# Patient Record
Sex: FEMALE | Race: BLACK OR AFRICAN AMERICAN | NOT HISPANIC OR LATINO
[De-identification: names, ages, dates, MRNs, and addresses within clinical notes are randomized per-mention and may not be internally consistent; named-entity substitution may affect disease eponyms.]

---

## 2021-03-11 ENCOUNTER — APPOINTMENT (OUTPATIENT)
Dept: BREAST CENTER | Facility: CLINIC | Age: 61
End: 2021-03-11
Payer: COMMERCIAL

## 2021-03-11 VITALS — HEART RATE: 83 BPM | DIASTOLIC BLOOD PRESSURE: 85 MMHG | SYSTOLIC BLOOD PRESSURE: 157 MMHG

## 2021-03-11 VITALS — BODY MASS INDEX: 29.62 KG/M2 | HEIGHT: 69 IN | WEIGHT: 200 LBS

## 2021-03-11 PROBLEM — Z00.00 ENCOUNTER FOR PREVENTIVE HEALTH EXAMINATION: Status: ACTIVE | Noted: 2021-03-11

## 2021-03-11 PROCEDURE — 99072 ADDL SUPL MATRL&STAF TM PHE: CPT

## 2021-03-11 PROCEDURE — 99205 OFFICE O/P NEW HI 60 MIN: CPT

## 2021-03-11 NOTE — PHYSICAL EXAM
[Normocephalic] : normocephalic [Atraumatic] : atraumatic [Supple] : supple [No Supraclavicular Adenopathy] : no supraclavicular adenopathy [No Cervical Adenopathy] : no cervical adenopathy [No Thyromegaly] : no thyromegaly [Normal Sinus Rhythm] : normal sinus rhythm [Examined in the supine and seated position] : examined in the supine and seated position [No dominant masses] : no dominant masses in right breast  [No dominant masses] : no dominant masses left breast [No Nipple Retraction] : no left nipple retraction [No Nipple Discharge] : no left nipple discharge [No Axillary Lymphadenopathy] : no left axillary lymphadenopathy [No Edema] : no edema [No Rashes] : no rashes [No Ulceration] : no ulceration [de-identified] : +core Bx site L 2:00 N5 w/ vague nod th c/t Bx site

## 2021-03-11 NOTE — HISTORY OF PRESENT ILLNESS
[FreeTextEntry1] : Pt w/ L DCIS detected as microcalcs on Scr Mammo (12/8/20)\par Mammo (12/8/20): FG, +5mm group faint calc's L 2:00 N5\par L Mammo w/ Mag Views (1/6/21): +1.3cm faint microcalcs L 2:00 N5 > Bx rec'ed\par Repeat L Mammo w/ Mag Views ([place])(3/3/21)" +5mm cluster calc's L 2:00 > Bx rec'ed\par S/P L Stereot Bx (L 2:00)(3/4/21): +DCIS, int grade, ER+, NC+\par

## 2021-03-17 DIAGNOSIS — R92.8 OTHER ABNORMAL AND INCONCLUSIVE FINDINGS ON DIAGNOSTIC IMAGING OF BREAST: ICD-10-CM

## 2021-04-02 ENCOUNTER — APPOINTMENT (OUTPATIENT)
Dept: BREAST CENTER | Facility: HOSPITAL | Age: 61
End: 2021-04-02
Payer: COMMERCIAL

## 2021-04-02 PROCEDURE — 19301 PARTIAL MASTECTOMY: CPT | Mod: LT

## 2021-04-12 ENCOUNTER — APPOINTMENT (OUTPATIENT)
Dept: BREAST CENTER | Facility: CLINIC | Age: 61
End: 2021-04-12
Payer: COMMERCIAL

## 2021-04-12 DIAGNOSIS — Z12.31 ENCOUNTER FOR SCREENING MAMMOGRAM FOR MALIGNANT NEOPLASM OF BREAST: ICD-10-CM

## 2021-04-12 PROCEDURE — 99024 POSTOP FOLLOW-UP VISIT: CPT

## 2021-04-12 NOTE — HISTORY OF PRESENT ILLNESS
[FreeTextEntry1] : S/P L PMX w/NL (4/2/21): +2.5cm DCIS, int grade, -margins, ER+, [place]+\par Pt w/ L DCIS detected as microcalcs on Scr Mammo (12/8/20)\par Mammo (12/8/20): FG, +5mm group faint calc's L 2:00 N5\par L Mammo w/ Mag Views (1/6/21): +1.3cm faint microcalcs L 2:00 N5 > Bx rec'ed\par Repeat L Mammo w/ Mag Views ([place])(3/3/21)" +5mm cluster calc's L 2:00 > Bx rec'ed\par S/P L Stereot Bx (L 2:00)(3/4/21): +DCIS, int grade, ER+, IA+\par

## 2021-04-12 NOTE — PHYSICAL EXAM
[de-identified] : Pt w/o c/o\par Incision C&D. Min induration/ecchymosis. PDS d/c'ed.\par Stable. Pathology discussed.

## 2021-04-26 ENCOUNTER — APPOINTMENT (OUTPATIENT)
Dept: RADIATION ONCOLOGY | Facility: CLINIC | Age: 61
End: 2021-04-26
Payer: COMMERCIAL

## 2021-04-26 VITALS
OXYGEN SATURATION: 100 % | RESPIRATION RATE: 16 BRPM | DIASTOLIC BLOOD PRESSURE: 80 MMHG | WEIGHT: 222 LBS | TEMPERATURE: 98 F | SYSTOLIC BLOOD PRESSURE: 148 MMHG | BODY MASS INDEX: 32.88 KG/M2 | HEIGHT: 69 IN | HEART RATE: 78 BPM

## 2021-04-26 DIAGNOSIS — Z80.51 FAMILY HISTORY OF MALIGNANT NEOPLASM OF KIDNEY: ICD-10-CM

## 2021-04-26 DIAGNOSIS — Z78.9 OTHER SPECIFIED HEALTH STATUS: ICD-10-CM

## 2021-04-26 DIAGNOSIS — Z63.5 DISRUPTION OF FAMILY BY SEPARATION AND DIVORCE: ICD-10-CM

## 2021-04-26 PROCEDURE — 99204 OFFICE O/P NEW MOD 45 MIN: CPT

## 2021-04-26 PROCEDURE — 99072 ADDL SUPL MATRL&STAF TM PHE: CPT

## 2021-04-26 SDOH — SOCIAL STABILITY - SOCIAL INSECURITY: DISRUPTION OF FAMILY BY SEPARATION AND DIVORCE: Z63.5

## 2021-04-26 NOTE — VITALS
[Maximal Pain Intensity: 0/10] : 0/10 [Least Pain Intensity: 0/10] : 0/10 [NoTreatment Scheduled] : no treatment scheduled [90: Able to carry normal activity; minor signs or symptoms of disease.] : 90: Able to carry normal activity; minor signs or symptoms of disease.  [ECOG Performance Status: 1 - Restricted in physically strenuous activity but ambulatory and able to carry out work of a light or sedentary nature] : Performance Status: 1 - Restricted in physically strenuous activity but ambulatory and able to carry out work of a light or sedentary nature, e.g., light house work, office work [Date: ____________] : Patient's last distress assessment performed on [unfilled]. [0 - No Distress] : Distress Level: 0

## 2021-05-07 ENCOUNTER — APPOINTMENT (OUTPATIENT)
Dept: HEMATOLOGY ONCOLOGY | Facility: CLINIC | Age: 61
End: 2021-05-07
Payer: COMMERCIAL

## 2021-05-07 VITALS
HEART RATE: 75 BPM | WEIGHT: 218 LBS | DIASTOLIC BLOOD PRESSURE: 85 MMHG | OXYGEN SATURATION: 100 % | RESPIRATION RATE: 18 BRPM | SYSTOLIC BLOOD PRESSURE: 136 MMHG | TEMPERATURE: 98.8 F | HEIGHT: 69 IN | BODY MASS INDEX: 32.29 KG/M2

## 2021-05-07 PROCEDURE — 99205 OFFICE O/P NEW HI 60 MIN: CPT

## 2021-05-07 PROCEDURE — 99072 ADDL SUPL MATRL&STAF TM PHE: CPT

## 2021-05-07 NOTE — REASON FOR VISIT
[Initial Consultation] : an initial consultation [FreeTextEntry2] : Patient presents for medical oncology evaluation for DCIS

## 2021-05-07 NOTE — ASSESSMENT
[FreeTextEntry1] : Is a 60-year-old woman with DCIS ER/TX positive of the left breast status post lumpectomy, presenting for discussion regarding adjuvant antiestrogen therapy.\par \par 1) DCIS \par Reviewed the pathology at length\par Explained the risk of recurrence both in the ipsilateral and contralateral breast\par Agree with radiation, patient will be starting shortly with Dr. Mahmood\par Discussed the use of antiestrogens to reduce the risk of recurrence in both breasts\par Reviewed the mechanism, efficacy, side effects of tamoxifen versus aromatase inhibitors.\par On average antiestrogens can decrease the risk of recurrence by about 50%\par Tamoxifen is associated with an increased risk of endometrial cancer as well as thrombosis particularly in postmenopausal women\par Aromatase inhibitors are associated with increased risk of bone thinning joint pains and hot flashes.\par Given patient is postmenopausal we will likely go with an aromatase inhibitor but we will get a bone density to make sure she does not have any osteopenia or osteoporosis.  Explained that if she does have an element of bone thinning we will need to consider additional treatment to help prevent worsening while on an aromatase inhibitor. \par  We will discuss further After radiation\par Check CMP and breast cancer markers\par Patient will need close follow-up with Dr. GERTRUDE corrales her surgeon as well as monitoring of imaging\par \par 2) genetics \par Patient has been seen by genetics and genetic testing is currently pending given patient has breast cancer and her son  of kidney cancer in his 20s\par \par 3) vit d \par check level \par \par 4) osteopenia \par check dex \par \par All the above discussed with the patient at length today, time spent over 1 hour\par Follow-up after radiation

## 2021-05-07 NOTE — REVIEW OF SYSTEMS
[Fatigue] : fatigue [Negative] : Allergic/Immunologic [Fever] : no fever [Recent Change In Weight] : ~T no recent weight change [FreeTextEntry7] : colonsocopy up todate (sometme this year)  [FreeTextEntry8] : fibroid had hystectomy  [FreeTextEntry9] : right hip replacement , better now , 6 years ago last bone density

## 2021-05-07 NOTE — HISTORY OF PRESENT ILLNESS
[de-identified] : This is 60-year-old postmenopausal woman ending for new diagnosis of DCIS.\par December 2020 patient had a mammogram which showed a 5 mm group of faint calcifications at the 2 o'clock position left breast.  Follow-up mammogram showed 1.3 cm of faint microcalcifications in January 2021.  Then follow-up mammogram and March 2021 showed a 5 mm cluster of calcifications biopsy recommended.  Stereotactic left breast biopsy performed on March 4, 2021 revealing for DCIS intermediate grade ER/MN positive\par Patient underwent a left partial mastectomy on April 2, 2021 with Dr. Del Rosario, this was revealing for 2.5 cm of DCIS intermediate grade negative margins ER/MN positive(99 and 95%). \par All went well with  surgery . No infections \par She saw dr monterroso and he is planning on starting radiation. Maping on monday. \par \par [facility]ime at [employer] , mostly fed ex. \par \par Every year gyn, only has cervix \par \par neice - had some kind of cancer \par son- had kidney cancer \par \par Saw genetic , sent testing as part of gene protocol. \par \par \par \par \par  [de-identified] : Feeling well no new complaints\par No fevers, healing well from surgery energy is improving

## 2021-05-07 NOTE — PHYSICAL EXAM
[Normal] : affect appropriate [de-identified] : No masses or adenopathy bilaterally, lumpectomy healing well in the left outer quadrant, no drainage or erythema or warmth

## 2021-05-09 LAB
25(OH)D3 SERPL-MCNC: 31.3 NG/ML
ALBUMIN SERPL ELPH-MCNC: 4.1 G/DL
ALP BLD-CCNC: 92 U/L
ALT SERPL-CCNC: 12 U/L
ANION GAP SERPL CALC-SCNC: 14 MMOL/L
AST SERPL-CCNC: 17 U/L
BASOPHILS # BLD AUTO: 0.04 K/UL
BASOPHILS NFR BLD AUTO: 0.9 %
BILIRUB SERPL-MCNC: <0.2 MG/DL
BUN SERPL-MCNC: 17 MG/DL
CALCIUM SERPL-MCNC: 10 MG/DL
CANCER AG15-3 SERPL-ACNC: 37.7 U/ML
CEA SERPL-MCNC: 1.7 NG/ML
CHLORIDE SERPL-SCNC: 103 MMOL/L
CO2 SERPL-SCNC: 26 MMOL/L
CREAT SERPL-MCNC: 0.79 MG/DL
EOSINOPHIL # BLD AUTO: 0.09 K/UL
EOSINOPHIL NFR BLD AUTO: 1.9 %
GLUCOSE SERPL-MCNC: 70 MG/DL
HCT VFR BLD CALC: 35.2 %
HGB BLD-MCNC: 10.3 G/DL
IMM GRANULOCYTES NFR BLD AUTO: 0 %
LYMPHOCYTES # BLD AUTO: 1.82 K/UL
LYMPHOCYTES NFR BLD AUTO: 39.2 %
MAN DIFF?: NORMAL
MCHC RBC-ENTMCNC: 24.6 PG
MCHC RBC-ENTMCNC: 29.3 GM/DL
MCV RBC AUTO: 84 FL
MONOCYTES # BLD AUTO: 0.33 K/UL
MONOCYTES NFR BLD AUTO: 7.1 %
NEUTROPHILS # BLD AUTO: 2.36 K/UL
NEUTROPHILS NFR BLD AUTO: 50.9 %
PLATELET # BLD AUTO: 329 K/UL
POTASSIUM SERPL-SCNC: 5.6 MMOL/L
PROT SERPL-MCNC: 7.8 G/DL
RBC # BLD: 4.19 M/UL
RBC # FLD: 16.5 %
SODIUM SERPL-SCNC: 143 MMOL/L
WBC # FLD AUTO: 4.64 K/UL

## 2021-05-10 NOTE — HISTORY OF PRESENT ILLNESS
[FreeTextEntry1] : Ms. Pickens is a 60 year old female referred here by Dr. Del Rosario. \par \par On 3/3/21 she had a mammogram at [facility] which revealed a 5mm cluster of mildly variable calcifications upper outer left breast 2:00 position. \par \par On 3/4/21 Dr. Del Rosario- [facility]\par FINAL PATHOLOGIC DIAGNOSIS\par 1. LEFT BREAST 2:00 UPPER OUTER WITH CALC, STEREOTACTIC BIOPSY:\par -DUCTAL CARCINOMA IN SITU, FOCAL:\par 	-CRIBRIFORM.\par 	-INTERMEDIATE GRADE.\par 	-WITH NECROSIS.\par 	-WITH CALCIFICATION. \par -PER REPORT, BOTH ER AND MI ARE >90% STRONG INTENSITY NUCLEAR STAINING.\par \par 2. LEFT BREAST 2:00 UPPER OUTER, STEREOTACTIC BIOPSY:\par -MILDLY ECTATIC / DILATED DUCT AND VARIABLY FIBROUS STROMA.\par -CLINICAL CORRELATION IS APPRECIATED TO ASSESS IF CURRENT SAMPLE REPRESENTS TARGET.  \par \par 3/16/2021 MRI Bilateral Breasts ( [facility]) revealed a 2.3 x 1.8 cm area of clumped enhancement surrounding the biopsy cavity in the 2:00 position of the left breast at the site of the known DCIS. No other suspicious enhancing lesion is seen in the left breast and there is no suspicious left axillary adenopathy \par multiple mildly prominent lymph nodes are present in the right axilla. There is also probable intramammary lymph nodes at the 8;00 position of the right breast. Correlation with the diagnostic right mammography and right breast and axillary ultrasound is recommended to confirm that his represents a benign intramammary lymph nodes as well as reactive axillary lymph nodes secondary to the covid vaccine \par \par On 3/18/21 she had a unilateral right breast mammogram at [facility] which revealed  no mammographic evidence for breast malignancy. A lymph node is seen at the 9:00 periphery of the right breast. \par \par On 3/19/21 she had a breast ultrasound at [facility] which revealed benign inframammary lymph node at the 8:00 position of the right breast. Multiple benign-appearing lymph nodes are also present from 10-11:00 position of the right breast and within the axilla. The largest of the lymph nodes in the right axilla has a borderline thickened cortex of 3mm. \par \[facility] Procedure:   Left partial mastectomy.\par Tumor Site:  2:00.\par Size/Extent of DCIS:  ~25 mm.\par Histologic type:  Ductal carcinoma in situ.\par    Architectural Pattern:  Cribriform and solid.\par    Nuclear Grade:  Intermediate.\par    Necrosis:  Central necrosis present.\par Margins: \par    Distance from closest margin:  2 mm superior.\par    Distance from other margins:  5 mm inferior, 6 mm medial, others >10 mm.\par Regional Lymph Nodes:  No nodes submitted or found.\par Breast Biomarker Testing:\par    Estrogen Receptor:  Positive 99% 2-3+\par    Progesterone Receptor:  Positive 95% 2-3+\par Previous Specimen:  [id number] Left breast core biopsy 2:00 (outside slides):  \par    Ductal carcinoma in situ, intermediate grade, reported ER and MI >90% 3+.\par Pathologic Stage Classification (AJCC 8th ed):  p Tis(DCIS) NX\par \par FINAL PATHOLOGIC DIAGNOSIS\par LEFT PARTIAL MASTECTOMY: \par -DUCTAL CARCINOMA IN SITU.\par      -INTERMEDIATE NUCLEAR GRADE.\par      -CRIBRIFORM AND SOLID PATTERNS WITH CENTRAL NECROSIS, MICROCALCIFICATIONS\par       AND EXTENSION INTO LOBULES.\par      -DCIS SPAN:  ~25 MM.\par      -NARROWEST SURGICAL MARGINS:  2 MM FROM SUPERIOR SURFACE, 5 MM FROM\par       INFERIOR SURFACE AND 6 MM FROM MEDIAL SURFACE.  OTHER MARGINS >10 MM.\par -CYSTS, APOCRINE METAPLASIA, COLUMNAR CELL CHANGE AND LUMINAL CALCIFICATIONS.\par -PRIOR BIOPSY SITE.\par -SKIN WITH PIGMENTED SEBORRHEIC KERATOSIS. \par \par DCIS BIOMARKER IMMUNOSTAINS:\par -ER:  POSITIVE 99% 2-3+\par -MI:  POSITIVE 95% 2-3+\par \par Ms. Pickens states she is overall feeling well. She is 3 weeks s/p surgery and is healed and denies any pain. She has full /ROM of the left arm. She is going to see Dr. Irving Rodriguez on 5/7/2021.

## 2021-05-10 NOTE — PHYSICAL EXAM
[Breast Appearance] : normal in appearance [Breast Palpation Mass] : no palpable masses [Breast Abnormal Lactation (Galactorrhea)] : no nipple discharge [No UE Edema] : there is no upper extremity edema [Normal] : oriented to person, place and time, the affect was normal, the mood was normal and not anxious [de-identified] : Minimal asymmetry with the right breast smaller than left.  Well-healed lumpectomy scar in upper outer quadrant of left breast with no underlying induration.  No hematoma palpable.  Overlying skin looks healthy.  No lymph nodes palpable in left axilla.

## 2021-05-11 ENCOUNTER — NON-APPOINTMENT (OUTPATIENT)
Age: 61
End: 2021-05-11

## 2021-05-13 ENCOUNTER — NON-APPOINTMENT (OUTPATIENT)
Age: 61
End: 2021-05-13

## 2021-05-14 ENCOUNTER — LABORATORY RESULT (OUTPATIENT)
Age: 61
End: 2021-05-14

## 2021-05-14 ENCOUNTER — APPOINTMENT (OUTPATIENT)
Dept: HEMATOLOGY ONCOLOGY | Facility: CLINIC | Age: 61
End: 2021-05-14

## 2021-05-14 VITALS
HEIGHT: 69 IN | TEMPERATURE: 98 F | BODY MASS INDEX: 32.73 KG/M2 | WEIGHT: 221 LBS | HEART RATE: 80 BPM | OXYGEN SATURATION: 100 % | DIASTOLIC BLOOD PRESSURE: 85 MMHG | SYSTOLIC BLOOD PRESSURE: 150 MMHG | RESPIRATION RATE: 18 BRPM

## 2021-05-16 LAB
ANA SER IF-ACNC: NEGATIVE
BASOPHILS # BLD AUTO: 0.04 K/UL
BASOPHILS NFR BLD AUTO: 0.9 %
CANCER AG15-3 SERPL-ACNC: 34.2 U/ML
DEPRECATED KAPPA LC FREE/LAMBDA SER: 2.45 RATIO
EOSINOPHIL # BLD AUTO: 0.12 K/UL
EOSINOPHIL NFR BLD AUTO: 2.6 %
FERRITIN SERPL-MCNC: 48 NG/ML
FOLATE SERPL-MCNC: >20 NG/ML
HCT VFR BLD CALC: 32.9 %
HGB BLD-MCNC: 9.7 G/DL
IMM GRANULOCYTES NFR BLD AUTO: 0.2 %
IRON SATN MFR SERPL: 13 %
IRON SERPL-MCNC: 37 UG/DL
KAPPA LC CSF-MCNC: 1.32 MG/DL
KAPPA LC SERPL-MCNC: 3.24 MG/DL
LDH SERPL-CCNC: 288 U/L
LYMPHOCYTES # BLD AUTO: 1.83 K/UL
LYMPHOCYTES NFR BLD AUTO: 40.4 %
MAN DIFF?: NORMAL
MCHC RBC-ENTMCNC: 25.1 PG
MCHC RBC-ENTMCNC: 29.5 GM/DL
MCV RBC AUTO: 85 FL
MONOCYTES # BLD AUTO: 0.35 K/UL
MONOCYTES NFR BLD AUTO: 7.7 %
NEUTROPHILS # BLD AUTO: 2.18 K/UL
NEUTROPHILS NFR BLD AUTO: 48.2 %
PLATELET # BLD AUTO: 323 K/UL
RBC # BLD: 3.87 M/UL
RBC # BLD: 3.87 M/UL
RBC # FLD: 16.2 %
RETICS # AUTO: 1 %
RETICS AGGREG/RBC NFR: 36.8 K/UL
TIBC SERPL-MCNC: 289 UG/DL
TSH SERPL-ACNC: 0.98 UIU/ML
UIBC SERPL-MCNC: 252 UG/DL
VIT B12 SERPL-MCNC: 745 PG/ML
WBC # FLD AUTO: 4.53 K/UL

## 2021-05-17 VITALS
TEMPERATURE: 98 F | DIASTOLIC BLOOD PRESSURE: 88 MMHG | OXYGEN SATURATION: 99 % | HEART RATE: 78 BPM | RESPIRATION RATE: 16 BRPM | HEIGHT: 69 IN | SYSTOLIC BLOOD PRESSURE: 146 MMHG

## 2021-05-17 LAB
DIRECT COOMBS: ABNORMAL
HGB A MFR BLD: 97.9 %
HGB A2 MFR BLD: 2.1 %
HGB FRACT BLD-IMP: NORMAL

## 2021-05-17 NOTE — REVIEW OF SYSTEMS
[Fatigue: Grade 0] : Fatigue: Grade 0 [Pruritus: Grade 0] : Pruritus: Grade 0 [Skin Hyperpigmentation: Grade 0] : Skin Hyperpigmentation: Grade 0 [Dermatitis Radiation: Grade 0] : Dermatitis Radiation: Grade 0

## 2021-05-18 NOTE — HISTORY OF PRESENT ILLNESS
[FreeTextEntry1] : Ms. Cowart presents with DCIS of left breast, status post lumpectomy.\par \par 5/13/2021\par Ms. Cowart presents today for OTV. She completed 2/16 fractions to the left breast to a dose of 530 cGy.She is using Calendula cream BID, we discussed location and teach back of where the cream is applied.  She is feeling some heaviness and soreness in the breast.

## 2021-05-18 NOTE — DISEASE MANAGEMENT
[Pathological] : TNM Stage: p [TTNM] : 0 [NTNM] : 0 [MTNM] : 0 [0] : 0 [de-identified] : completed 2/16 fractions to the left breast to a dose of 530 cGy

## 2021-05-18 NOTE — PHYSICAL EXAM
[Normal] : oriented to person, place and time, the affect was normal, the mood was normal and not anxious [de-identified] : No erythema noted

## 2021-05-23 LAB
ALBUMIN MFR SERPL ELPH: 48 %
ALBUMIN SERPL-MCNC: 3.5 G/DL
ALBUMIN/GLOB SERPL: 0.9 RATIO
ALPHA1 GLOB MFR SERPL ELPH: 4.5 %
ALPHA1 GLOB SERPL ELPH-MCNC: 0.3 G/DL
ALPHA2 GLOB MFR SERPL ELPH: 12.6 %
ALPHA2 GLOB SERPL ELPH-MCNC: 0.9 G/DL
B-GLOBULIN MFR SERPL ELPH: 13 %
B-GLOBULIN SERPL ELPH-MCNC: 0.9 G/DL
DEPRECATED KAPPA LC FREE/LAMBDA SER: 2.45 RATIO
GAMMA GLOB FLD ELPH-MCNC: 1.6 G/DL
GAMMA GLOB MFR SERPL ELPH: 21.9 %
IGA SER QL IEP: 187 MG/DL
IGG SER QL IEP: 1784 MG/DL
IGM SER QL IEP: 113 MG/DL
INTERPRETATION SERPL IEP-IMP: NORMAL
KAPPA LC CSF-MCNC: 1.32 MG/DL
KAPPA LC SERPL-MCNC: 3.24 MG/DL
M PROTEIN MFR SERPL ELPH: NORMAL
M PROTEIN SPEC IFE-MCNC: NORMAL
MONOCLON BAND OBS SERPL: NORMAL
PROT SERPL-MCNC: 7.2 G/DL
PROT SERPL-MCNC: 7.2 G/DL

## 2021-05-25 ENCOUNTER — NON-APPOINTMENT (OUTPATIENT)
Age: 61
End: 2021-05-25

## 2021-05-25 VITALS
DIASTOLIC BLOOD PRESSURE: 79 MMHG | OXYGEN SATURATION: 99 % | RESPIRATION RATE: 16 BRPM | TEMPERATURE: 98 F | HEART RATE: 75 BPM | SYSTOLIC BLOOD PRESSURE: 119 MMHG

## 2021-05-25 NOTE — REVIEW OF SYSTEMS
[Fatigue: Grade 0] : Fatigue: Grade 0 [Breast Pain: Grade 1 - Mild pain] : Breast Pain: Grade 1 - Mild pain [Pruritus: Grade 0] : Pruritus: Grade 0 [Skin Hyperpigmentation: Grade 1 - Hyperpigmentation covering <10% BSA; no psychosocial impact] : Skin Hyperpigmentation: Grade 1 - Hyperpigmentation covering <10% BSA; no psychosocial impact [Dermatitis Radiation: Grade 0] : Dermatitis Radiation: Grade 0 [FreeTextEntry7] : soreness

## 2021-05-25 NOTE — PHYSICAL EXAM
[Normal] : oriented to person, place and time, the affect was normal, the mood was normal and not anxious [de-identified] : Moderate erythema of the breast.  No desquamation

## 2021-05-25 NOTE — HISTORY OF PRESENT ILLNESS
[FreeTextEntry1] : Ms. Cowart presents with DCIS of left breast, status post lumpectomy.\par \par 5/13/2021\par Ms. Cowart presents today for OTV. She completed 2/16 fractions to the left breast to a dose of 530 cGy.She is using Calendula cream BID, we discussed location and teach back of where the cream is applied.  She is feeling some heaviness and soreness in the breast. \par \par 5/25/2021\par Ms. Cowart presents today for OTV. She completed 10/16 fractions to the left breast to a dose of 2650 cGy.She is using Calendula cream BID, She has some swelling of the left breast and soreness. She has some hyperpigmentation of the skin. She has developed some pain in the left shoulder over the weekend. She is not sure if she pulled a muscle or if it could be from the position on the table, She will try some Advil and we will f/u with her.

## 2021-05-25 NOTE — DISEASE MANAGEMENT
[Pathological] : TNM Stage: p [0] : 0 [TTNM] : 0 [NTNM] : 0 [MTNM] : 0 [de-identified] : completed 10/16 fractions to the left breast to a dose of 2650 cGy

## 2021-06-01 ENCOUNTER — NON-APPOINTMENT (OUTPATIENT)
Age: 61
End: 2021-06-01

## 2021-06-01 VITALS
OXYGEN SATURATION: 99 % | RESPIRATION RATE: 16 BRPM | SYSTOLIC BLOOD PRESSURE: 122 MMHG | HEART RATE: 82 BPM | DIASTOLIC BLOOD PRESSURE: 80 MMHG | TEMPERATURE: 98 F

## 2021-06-01 NOTE — REVIEW OF SYSTEMS
[Fatigue: Grade 0] : Fatigue: Grade 0 [Localized Edema: Grade 1 - Localized to dependent areas, no disability or functional impairment] : Localized Edema: Grade 1 - Localized to dependent areas, no disability or functional impairment [Breast Pain: Grade 1 - Mild pain] : Breast Pain: Grade 1 - Mild pain [Pruritus: Grade 0] : Pruritus: Grade 0 [Skin Hyperpigmentation: Grade 1 - Hyperpigmentation covering <10% BSA; no psychosocial impact] : Skin Hyperpigmentation: Grade 1 - Hyperpigmentation covering <10% BSA; no psychosocial impact [Dermatitis Radiation: Grade 0] : Dermatitis Radiation: Grade 0 [FreeTextEntry4] : left breast swelling  [FreeTextEntry7] : soreness

## 2021-06-01 NOTE — HISTORY OF PRESENT ILLNESS
[FreeTextEntry1] : Ms. Cowart presents with DCIS of left breast, status post lumpectomy.\par \par 5/13/2021\par Ms. Cowart presents today for OTV. She completed 2/16 fractions to the left breast to a dose of 530 cGy.She is using Calendula cream BID, we discussed location and teach back of where the cream is applied.  She is feeling some heaviness and soreness in the breast. \par \par 5/25/2021\par Ms. Cowart presents today for OTV. She completed 10/16 fractions to the left breast to a dose of 2650 cGy.She is using Calendula cream BID, She has some swelling of the left breast and soreness. She has some hyperpigmentation of the skin. She has developed some pain in the left shoulder over the weekend. She is not sure if she pulled a muscle or if it could be from the position on the table, She will try some Advil and we will f/u with her.  \par \par 6//1/2021\par peter Cowart presents today for OTV. She completed 14/16 fractions to the left breast to a dose of 3710 cGy.She is using Calendula cream BID, She has  swelling of the left breast and soreness. She has  hyperpigmentation of the skin but it is intact. The pain in the left shoulder has improved some with Aleve.

## 2021-06-01 NOTE — PHYSICAL EXAM
[Normal] : oriented to person, place and time, the affect was normal, the mood was normal and not anxious [de-identified] : Brisk erythema with hyperpigmentation of left breast.  No desquamation seen

## 2021-06-01 NOTE — DISEASE MANAGEMENT
[Pathological] : TNM Stage: p [0] : 0 [TTNM] : 0 [NTNM] : 0 [MTNM] : 0 [de-identified] : completed 14/16 fractions to the left breast to a dose of 3710 cGy

## 2021-06-08 ENCOUNTER — NON-APPOINTMENT (OUTPATIENT)
Age: 61
End: 2021-06-08

## 2021-06-08 VITALS
DIASTOLIC BLOOD PRESSURE: 82 MMHG | RESPIRATION RATE: 14 BRPM | OXYGEN SATURATION: 99 % | BODY MASS INDEX: 32.58 KG/M2 | HEART RATE: 74 BPM | TEMPERATURE: 98 F | SYSTOLIC BLOOD PRESSURE: 142 MMHG | HEIGHT: 69 IN | WEIGHT: 220 LBS

## 2021-06-08 NOTE — DISEASE MANAGEMENT
[Pathological] : TNM Stage: p [0] : 0 [TTNM] : 0 [NTNM] : 0 [MTNM] : 0 [de-identified] : completed 16/16 fractions to the left breast to a dose of 4240cGy, and 2 of 4 boost fractions 500 cGy

## 2021-06-08 NOTE — HISTORY OF PRESENT ILLNESS
[FreeTextEntry1] : Ms. Cowart presents with DCIS of left breast, status post lumpectomy.\par \par 5/13/2021\par Ms. Cowart presents today for OTV. She completed 2/16 fractions to the left breast to a dose of 530 cGy.She is using Calendula cream BID, we discussed location and teach back of where the cream is applied.  She is feeling some heaviness and soreness in the breast. \par \par 5/25/2021\par Ms. Cowart presents today for OTV. She completed 10/16 fractions to the left breast to a dose of 2650 cGy.She is using Calendula cream BID, She has some swelling of the left breast and soreness. She has some hyperpigmentation of the skin. She has developed some pain in the left shoulder over the weekend. She is not sure if she pulled a muscle or if it could be from the position on the table, She will try some Advil and we will f/u with her.  \par \par 6//1/2021\par peter Cowart presents today for OTV. She completed 14/16 fractions to the left breast to a dose of 3710 cGy.She is using Calendula cream BID, She has  swelling of the left breast and soreness. She has  hyperpigmentation of the skin but it is intact. The pain in the left shoulder has improved some with Aleve. \par \par 6/8/21\par She presents today for an OTV and has completed 16/16 fractions to the left breast to a dose of 4240cGy, and 2 of 4 boost fractions 500 cGy. She is using calendula cream, and complains of itching on her skin. She has hyperpigmentation on her breast. She denies any pain, she is eating well, and she is sleeping well.

## 2021-06-08 NOTE — PHYSICAL EXAM
[Normal] : oriented to person, place and time, the affect was normal, the mood was normal and not anxious [de-identified] : Brisk hyperpigmentation of left breast.  No desquamation noted

## 2021-06-08 NOTE — REVIEW OF SYSTEMS
[Fatigue: Grade 0] : Fatigue: Grade 0 [Localized Edema: Grade 0] : Localized Edema: Grade 0  [Breast Pain: Grade 1 - Mild pain] : Breast Pain: Grade 1 - Mild pain [Alopecia: Grade 0] : Alopecia: Grade 0 [Pruritus: Grade 1 - Mild or localized; topical intervention indicated] : Pruritus: Grade 1 - Mild or localized; topical intervention indicated [Skin Atrophy: Grade 0] : Skin Atrophy: Grade 0 [Skin Hyperpigmentation: Grade 1 - Hyperpigmentation covering <10% BSA; no psychosocial impact] : Skin Hyperpigmentation: Grade 1 - Hyperpigmentation covering <10% BSA; no psychosocial impact [Skin Induration: Grade 0] : Skin Induration: Grade 0 [Dermatitis Radiation: Grade 0] : Dermatitis Radiation: Grade 0 [FreeTextEntry7] : soreness

## 2021-06-11 ENCOUNTER — APPOINTMENT (OUTPATIENT)
Dept: HEMATOLOGY ONCOLOGY | Facility: CLINIC | Age: 61
End: 2021-06-11
Payer: COMMERCIAL

## 2021-06-11 ENCOUNTER — LABORATORY RESULT (OUTPATIENT)
Age: 61
End: 2021-06-11

## 2021-06-11 VITALS
DIASTOLIC BLOOD PRESSURE: 87 MMHG | WEIGHT: 218 LBS | BODY MASS INDEX: 32.29 KG/M2 | HEART RATE: 77 BPM | TEMPERATURE: 98.2 F | RESPIRATION RATE: 17 BRPM | OXYGEN SATURATION: 100 % | SYSTOLIC BLOOD PRESSURE: 169 MMHG | HEIGHT: 69 IN

## 2021-06-11 PROCEDURE — 99215 OFFICE O/P EST HI 40 MIN: CPT

## 2021-06-11 PROCEDURE — 99072 ADDL SUPL MATRL&STAF TM PHE: CPT

## 2021-06-11 NOTE — HISTORY OF PRESENT ILLNESS
[de-identified] : This is 60-year-old postmenopausal woman ending for new diagnosis of DCIS.\par December 2020 patient had a mammogram which showed a 5 mm group of faint calcifications at the 2 o'clock position left breast.  Follow-up mammogram showed 1.3 cm of faint microcalcifications in January 2021.  Then follow-up mammogram and March 2021 showed a 5 mm cluster of calcifications biopsy recommended.  Stereotactic left breast biopsy performed on March 4, 2021 revealing for DCIS intermediate grade ER/VT positive\par Patient underwent a left partial mastectomy on April 2, 2021 with Dr. Del Rosario, this was revealing for 2.5 cm of DCIS intermediate grade negative margins ER/VT positive(99 and 95%). \par All went well with  surgery . No infections \par She saw dr monterroso and he is planning on starting radiation. Maping on monday. \par \par [facility]ime at [employer] , mostly fed ex. \par \par Every year gyn, only has cervix \par \par neice - had some kind of cancer \par son- had kidney cancer \par \par Saw genetic , sent testing as part of gene protocol. 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

## 2021-06-13 LAB
ALBUMIN SERPL ELPH-MCNC: 4.1 G/DL
ALP BLD-CCNC: 94 U/L
ALT SERPL-CCNC: 12 U/L
ANION GAP SERPL CALC-SCNC: 12 MMOL/L
AST SERPL-CCNC: 17 U/L
BASOPHILS # BLD AUTO: 0.03 K/UL
BASOPHILS NFR BLD AUTO: 0.7 %
BILIRUB SERPL-MCNC: 0.2 MG/DL
BUN SERPL-MCNC: 13 MG/DL
CALCIUM SERPL-MCNC: 10.1 MG/DL
CANCER AG15-3 SERPL-ACNC: 35.9 U/ML
CEA SERPL-MCNC: 1.8 NG/ML
CHLORIDE SERPL-SCNC: 102 MMOL/L
CO2 SERPL-SCNC: 28 MMOL/L
CREAT SERPL-MCNC: 0.76 MG/DL
DEPRECATED KAPPA LC FREE/LAMBDA SER: 1.95 RATIO
EOSINOPHIL # BLD AUTO: 0.1 K/UL
EOSINOPHIL NFR BLD AUTO: 2.4 %
GLUCOSE SERPL-MCNC: 79 MG/DL
HAPTOGLOB SERPL-MCNC: 362 MG/DL
HCT VFR BLD CALC: 35.2 %
HGB BLD-MCNC: 10.4 G/DL
IMM GRANULOCYTES NFR BLD AUTO: 0.2 %
IRON SATN MFR SERPL: 13 %
IRON SERPL-MCNC: 38 UG/DL
KAPPA LC CSF-MCNC: 1.55 MG/DL
KAPPA LC SERPL-MCNC: 3.02 MG/DL
LDH SERPL-CCNC: 211 U/L
LYMPHOCYTES # BLD AUTO: 1.35 K/UL
LYMPHOCYTES NFR BLD AUTO: 33 %
MAN DIFF?: NORMAL
MCHC RBC-ENTMCNC: 24.8 PG
MCHC RBC-ENTMCNC: 29.5 GM/DL
MCV RBC AUTO: 83.8 FL
MONOCYTES # BLD AUTO: 0.3 K/UL
MONOCYTES NFR BLD AUTO: 7.3 %
NEUTROPHILS # BLD AUTO: 2.3 K/UL
NEUTROPHILS NFR BLD AUTO: 56.4 %
PLATELET # BLD AUTO: 346 K/UL
POTASSIUM SERPL-SCNC: 4.8 MMOL/L
PROT SERPL-MCNC: 7.6 G/DL
RBC # BLD: 4.2 M/UL
RBC # BLD: 4.2 M/UL
RBC # FLD: 15.9 %
RETICS # AUTO: 1.2 %
RETICS AGGREG/RBC NFR: 52.1 K/UL
SODIUM SERPL-SCNC: 141 MMOL/L
TIBC SERPL-MCNC: 297 UG/DL
TSH SERPL-ACNC: 1.34 UIU/ML
UIBC SERPL-MCNC: 258 UG/DL
WBC # FLD AUTO: 4.09 K/UL

## 2021-06-20 LAB
ALBUMIN MFR SERPL ELPH: 47.6 %
ALBUMIN SERPL-MCNC: 3.6 G/DL
ALBUMIN/GLOB SERPL: 0.9 RATIO
ALPHA1 GLOB MFR SERPL ELPH: 4.6 %
ALPHA1 GLOB SERPL ELPH-MCNC: 0.3 G/DL
ALPHA2 GLOB MFR SERPL ELPH: 13.3 %
ALPHA2 GLOB SERPL ELPH-MCNC: 1 G/DL
B-GLOBULIN MFR SERPL ELPH: 12.9 %
B-GLOBULIN SERPL ELPH-MCNC: 1 G/DL
DEPRECATED KAPPA LC FREE/LAMBDA SER: 1.95 RATIO
DIRECT COOMBS: ABNORMAL
GAMMA GLOB FLD ELPH-MCNC: 1.6 G/DL
GAMMA GLOB MFR SERPL ELPH: 21.6 %
IGA 24H UR QL IFE: NORMAL
IGA SER QL IEP: 208 MG/DL
IGG SER QL IEP: 1797 MG/DL
IGM SER QL IEP: 105 MG/DL
INTERPRETATION SERPL IEP-IMP: NORMAL
KAPPA LC CSF-MCNC: 1.55 MG/DL
KAPPA LC SERPL-MCNC: 3.02 MG/DL
M PROTEIN MFR SERPL ELPH: NORMAL
M PROTEIN SPEC IFE-MCNC: NORMAL
MONOCLON BAND OBS SERPL: NORMAL
PROT SERPL-MCNC: 7.6 G/DL
PROT SERPL-MCNC: 7.6 G/DL

## 2021-07-09 ENCOUNTER — APPOINTMENT (OUTPATIENT)
Dept: HEMATOLOGY ONCOLOGY | Facility: CLINIC | Age: 61
End: 2021-07-09
Payer: COMMERCIAL

## 2021-07-09 VITALS
HEIGHT: 69 IN | OXYGEN SATURATION: 100 % | WEIGHT: 218 LBS | RESPIRATION RATE: 18 BRPM | SYSTOLIC BLOOD PRESSURE: 149 MMHG | DIASTOLIC BLOOD PRESSURE: 84 MMHG | BODY MASS INDEX: 32.29 KG/M2 | HEART RATE: 76 BPM | TEMPERATURE: 98 F

## 2021-07-09 PROCEDURE — 99214 OFFICE O/P EST MOD 30 MIN: CPT

## 2021-07-09 PROCEDURE — 99072 ADDL SUPL MATRL&STAF TM PHE: CPT

## 2021-07-12 NOTE — REASON FOR VISIT
[Follow-Up Visit] : a follow-up [Initial Consultation] : an initial consultation [FreeTextEntry2] : Patient presents for follow-up of DCIS , MGUS, and anemia

## 2021-07-12 NOTE — PHYSICAL EXAM
[Obese] : obese [Normal] : affect appropriate [de-identified] : left breast without desquamation  [de-identified] : no point tenderness noted to R hip or knees.

## 2021-07-12 NOTE — HISTORY OF PRESENT ILLNESS
[de-identified] : This is 60-year-old postmenopausal woman ending for new diagnosis of DCIS.\par December 2020 patient had a mammogram which showed a 5 mm group of faint calcifications at the 2 o'clock position left breast.  Follow-up mammogram showed 1.3 cm of faint microcalcifications in January 2021.  Then follow-up mammogram and March 2021 showed a 5 mm cluster of calcifications biopsy recommended.  Stereotactic left breast biopsy performed on March 4, 2021 revealing for DCIS intermediate grade ER/MS positive\par Patient underwent a left partial mastectomy on April 2, 2021 with Dr. Del Rosario, this was revealing for 2.5 cm of DCIS intermediate grade negative margins ER/MS positive(99 and 95%). \par All went well with  surgery . No infections \par She saw dr monterroso and he is planning on starting radiation. Maping on monday. \par \par [facility]ime at [employer] , mostly fed ex. \par \par Every year gyn, only has cervix \par \par neice - had some kind of cancer \par son- had kidney cancer \par \par Saw genetic , sent testing as part of gene protocol. 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

## 2021-07-12 NOTE — REVIEW OF SYSTEMS
[Fatigue] : fatigue [Joint Stiffness] : joint stiffness [Negative] : Allergic/Immunologic [Fever] : no fever [Recent Change In Weight] : ~T no recent weight change [FreeTextEntry9] : s/p right hip replacement , feels good

## 2021-07-13 LAB
25(OH)D3 SERPL-MCNC: 32.1 NG/ML
ALBUMIN SERPL ELPH-MCNC: 4 G/DL
ALP BLD-CCNC: 94 U/L
ALT SERPL-CCNC: 11 U/L
ANION GAP SERPL CALC-SCNC: 15 MMOL/L
AST SERPL-CCNC: 18 U/L
BILIRUB SERPL-MCNC: <0.2 MG/DL
BUN SERPL-MCNC: 12 MG/DL
CALCIUM SERPL-MCNC: 10 MG/DL
CANCER AG15-3 SERPL-ACNC: 30.6 U/ML
CEA SERPL-MCNC: 1.7 NG/ML
CHLORIDE SERPL-SCNC: 102 MMOL/L
CO2 SERPL-SCNC: 25 MMOL/L
CREAT SERPL-MCNC: 0.76 MG/DL
DEPRECATED KAPPA LC FREE/LAMBDA SER: 2.25 RATIO
DEPRECATED KAPPA LC FREE/LAMBDA SER: 2.25 RATIO
FERRITIN SERPL-MCNC: 61 NG/ML
FOLATE SERPL-MCNC: 17.1 NG/ML
GLUCOSE SERPL-MCNC: 51 MG/DL
IGA SER QL IEP: 185 MG/DL
IGG SER QL IEP: 1673 MG/DL
IGM SER QL IEP: 93 MG/DL
IRON SATN MFR SERPL: 11 %
IRON SERPL-MCNC: 30 UG/DL
KAPPA LC CSF-MCNC: 1.49 MG/DL
KAPPA LC CSF-MCNC: 1.49 MG/DL
KAPPA LC SERPL-MCNC: 3.35 MG/DL
KAPPA LC SERPL-MCNC: 3.35 MG/DL
LDH SERPL-CCNC: 214 U/L
POTASSIUM SERPL-SCNC: 5.2 MMOL/L
PROT SERPL-MCNC: 7.3 G/DL
SODIUM SERPL-SCNC: 143 MMOL/L
TIBC SERPL-MCNC: 262 UG/DL
UIBC SERPL-MCNC: 232 UG/DL
VIT B12 SERPL-MCNC: 834 PG/ML

## 2021-07-20 LAB
ALBUMIN MFR SERPL ELPH: 46.1 %
ALBUMIN SERPL-MCNC: 3.3 G/DL
ALBUMIN/GLOB SERPL: 0.8 RATIO
ALPHA1 GLOB MFR SERPL ELPH: 4.7 %
ALPHA1 GLOB SERPL ELPH-MCNC: 0.3 G/DL
ALPHA2 GLOB MFR SERPL ELPH: 13.8 %
ALPHA2 GLOB SERPL ELPH-MCNC: 1 G/DL
B-GLOBULIN MFR SERPL ELPH: 13.1 %
B-GLOBULIN SERPL ELPH-MCNC: 0.9 G/DL
GAMMA GLOB FLD ELPH-MCNC: 1.6 G/DL
GAMMA GLOB MFR SERPL ELPH: 22.3 %
INTERPRETATION SERPL IEP-IMP: NORMAL
M PROTEIN MFR SERPL ELPH: NORMAL
M PROTEIN SPEC IFE-MCNC: NORMAL
MONOCLON BAND OBS SERPL: NORMAL
PROT SERPL-MCNC: 7.2 G/DL
PROT SERPL-MCNC: 7.2 G/DL

## 2021-08-04 ENCOUNTER — NON-APPOINTMENT (OUTPATIENT)
Age: 61
End: 2021-08-04

## 2021-08-04 ENCOUNTER — APPOINTMENT (OUTPATIENT)
Dept: BREAST CENTER | Facility: CLINIC | Age: 61
End: 2021-08-04
Payer: COMMERCIAL

## 2021-08-04 VITALS
WEIGHT: 218 LBS | HEART RATE: 83 BPM | HEIGHT: 69 IN | SYSTOLIC BLOOD PRESSURE: 137 MMHG | BODY MASS INDEX: 32.29 KG/M2 | DIASTOLIC BLOOD PRESSURE: 83 MMHG

## 2021-08-04 PROCEDURE — 99213 OFFICE O/P EST LOW 20 MIN: CPT

## 2021-08-04 NOTE — HISTORY OF PRESENT ILLNESS
[FreeTextEntry1] : S/P L PMX w/NL (4/2/21): +2.5cm DCIS, int grade, -margins, ER+, [place]+\par Pt w/ L DCIS detected as microcalcs on Scr Mammo (12/8/20)\par L Stage 0 DCIS\par Completed RT (6/21)([place])\par Started Arimidex (6/21)(Jennifer)\par Mammo (12/8/20): FG, +5mm group faint calc's L 2:00 N5\par L Mammo w/ Mag Views (1/6/21): +1.3cm faint microcalcs L 2:00 N5 > Bx rec'ed\par Repeat L Mammo w/ Mag Views ([place])(3/3/21)" +5mm cluster calc's L 2:00 > Bx rec'ed\par S/P L Stereot Bx (L 2:00)(3/4/21): +DCIS, int grade, ER+, [place]+\par Got second Modern a(2/21)(LUCARLYLE)\par No other Breast Surgery, Breast Procedures or Nipple Discharge. \par No FH Breast, Ovarian, Pancreatic Cancer or Melanoma. \par No MH/FH changes. Taking Ca/D. ROS reviewed/discussed. Last Bone Densitometry (6/21): WNL\par

## 2021-08-04 NOTE — PHYSICAL EXAM
[Normocephalic] : normocephalic [Atraumatic] : atraumatic [Supple] : supple [No Supraclavicular Adenopathy] : no supraclavicular adenopathy [No Cervical Adenopathy] : no cervical adenopathy [No Thyromegaly] : no thyromegaly [Normal Sinus Rhythm] : normal sinus rhythm [Examined in the supine and seated position] : examined in the supine and seated position [No dominant masses] : no dominant masses in right breast  [No dominant masses] : no dominant masses left breast [No Nipple Retraction] : no left nipple retraction [No Nipple Discharge] : no left nipple discharge [No Axillary Lymphadenopathy] : no left axillary lymphadenopathy [No Edema] : no edema [No Rashes] : no rashes [No Ulceration] : no ulceration [de-identified] : S/P PMX/RT. NER. %. No lymphedema. \par +mild RT tanning

## 2021-10-15 ENCOUNTER — APPOINTMENT (OUTPATIENT)
Dept: HEMATOLOGY ONCOLOGY | Facility: CLINIC | Age: 61
End: 2021-10-15
Payer: COMMERCIAL

## 2021-10-15 VITALS
DIASTOLIC BLOOD PRESSURE: 73 MMHG | HEIGHT: 69 IN | OXYGEN SATURATION: 99 % | HEART RATE: 75 BPM | RESPIRATION RATE: 18 BRPM | BODY MASS INDEX: 32.6 KG/M2 | TEMPERATURE: 97.6 F | SYSTOLIC BLOOD PRESSURE: 123 MMHG | WEIGHT: 220.13 LBS

## 2021-10-15 DIAGNOSIS — R92.1 MAMMOGRAPHIC CALCIFICATION FOUND ON DIAGNOSTIC IMAGING OF BREAST: ICD-10-CM

## 2021-10-15 PROCEDURE — 99214 OFFICE O/P EST MOD 30 MIN: CPT

## 2021-10-15 NOTE — ASSESSMENT
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\par This MGUS combined with the anemia is concerning to me for an underlying myeloma or lymphoma.\par discussed  my concern regarding the possibility for an underlying bone marrow disorder\par I would advise a bone marrow biopsy to rule out myeloma or lymphoma\par Discussed bone marrow biopsy procedure and risks at length with the patient she is agreeable\par rpeeat labs today \par Proceed with bone marrow biopsy\par \par 6) Anemia \par Reviewed anemia work-up\par Discussed the positive Oneida test as well as the MGUS\par as above \par repeat labs today \par proceed with bm bx \par \par dw patient at length \par follow up for bm bx

## 2021-10-15 NOTE — HISTORY OF PRESENT ILLNESS
[de-identified] : This is 60-year-old postmenopausal woman ending for new diagnosis of DCIS.\par December 2020 patient had a mammogram which showed a 5 mm group of faint calcifications at the 2 o'clock position left breast.  Follow-up mammogram showed 1.3 cm of faint microcalcifications in January 2021.  Then follow-up mammogram and March 2021 showed a 5 mm cluster of calcifications biopsy recommended.  Stereotactic left breast biopsy performed on March 4, 2021 revealing for DCIS intermediate grade ER/TX positive\par Patient underwent a left partial mastectomy on April 2, 2021 with Dr. Del Rosario, this was revealing for 2.5 cm of DCIS intermediate grade negative margins ER/TX positive(99 and 95%). \par All went well with  surgery . No infections \par She saw dr monterroso and he is planning on starting radiation. Maping on monday. \par \par [facility]ime at [employer] , mostly fed ex. \par \par Every year gyn, only has cervix \par \par neice - had some kind of cancer \par son- had kidney cancer \par \par Saw genetic , sent testing as part of gene protocol. 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

## 2021-10-15 NOTE — REASON FOR VISIT
[Follow-Up Visit] : a follow-up [FreeTextEntry2] : Patient presents for follow-up of anemia and MGUS and DCIS

## 2021-10-24 LAB
25(OH)D3 SERPL-MCNC: 30.5 NG/ML
ALBUMIN MFR SERPL ELPH: 47.2 %
ALBUMIN SERPL-MCNC: 3.5 G/DL
ALBUMIN/GLOB SERPL: 0.9 RATIO
ALPHA1 GLOB MFR SERPL ELPH: 5.2 %
ALPHA1 GLOB SERPL ELPH-MCNC: 0.4 G/DL
ALPHA2 GLOB MFR SERPL ELPH: 13.4 %
ALPHA2 GLOB SERPL ELPH-MCNC: 1 G/DL
B-GLOBULIN MFR SERPL ELPH: 12.7 %
B-GLOBULIN SERPL ELPH-MCNC: 1 G/DL
CANCER AG15-3 SERPL-ACNC: 32.2 U/ML
CEA SERPL-MCNC: 1.8 NG/ML
DEPRECATED KAPPA LC FREE/LAMBDA SER: 1.8 RATIO
DEPRECATED KAPPA LC FREE/LAMBDA SER: 1.8 RATIO
GAMMA GLOB FLD ELPH-MCNC: 1.6 G/DL
GAMMA GLOB MFR SERPL ELPH: 21.5 %
IGA SER QL IEP: 203 MG/DL
IGG SER QL IEP: 1860 MG/DL
IGM SER QL IEP: 91 MG/DL
INTERPRETATION SERPL IEP-IMP: NORMAL
KAPPA LC CSF-MCNC: 1.57 MG/DL
KAPPA LC CSF-MCNC: 1.57 MG/DL
KAPPA LC SERPL-MCNC: 2.83 MG/DL
KAPPA LC SERPL-MCNC: 2.83 MG/DL
M PROTEIN MFR SERPL ELPH: NORMAL
M PROTEIN SPEC IFE-MCNC: NORMAL
MONOCLON BAND OBS SERPL: NORMAL
PROT SERPL-MCNC: 7.5 G/DL
PROT SERPL-MCNC: 7.5 G/DL

## 2021-11-23 ENCOUNTER — RESULT REVIEW (OUTPATIENT)
Age: 61
End: 2021-11-23

## 2021-11-23 ENCOUNTER — APPOINTMENT (OUTPATIENT)
Dept: HEMATOLOGY ONCOLOGY | Facility: CLINIC | Age: 61
End: 2021-11-23
Payer: COMMERCIAL

## 2021-11-23 VITALS
WEIGHT: 218 LBS | RESPIRATION RATE: 18 BRPM | HEIGHT: 69 IN | BODY MASS INDEX: 32.29 KG/M2 | HEART RATE: 79 BPM | TEMPERATURE: 98.9 F | OXYGEN SATURATION: 100 % | SYSTOLIC BLOOD PRESSURE: 133 MMHG | DIASTOLIC BLOOD PRESSURE: 79 MMHG

## 2021-11-23 PROCEDURE — 38220 DX BONE MARROW ASPIRATIONS: CPT | Mod: LT

## 2021-11-23 PROCEDURE — 99214 OFFICE O/P EST MOD 30 MIN: CPT | Mod: 25

## 2021-11-23 PROCEDURE — 38221 DX BONE MARROW BIOPSIES: CPT

## 2021-11-23 NOTE — PROCEDURE
[Bone Marrow Biopsy] : bone marrow biopsy [Bone Marrow Aspiration] : bone marrow aspiration  [Patient] : the patient [Verbal Consent Obtained] : verbal consent was obtained prior to the procedure [Patient identification verified] : patient identification verified [Procedure verified and consent obtained] : procedure verified and consent obtained [Laterality verified and correct site marked] : laterality verified and correct site marked [Left] : site: left [Correct positioning] : correct positioning [Prone] : prone [Superior iliac spine was identified] : the superior iliac spine was identified. [The left posterior iliac crest was prepped with betadine and draped, using sterile technique.] : The left posterior iliac crest was prepped with betadine and draped, using sterile technique. [Lidocaine was injected and into the periosteum overlying the site.] : Lidocaine was injected and into the periosteum overlying the site. [Aspirate] : aspirate [Cytogenetics] : cytogenetics [FISH] : FISH [Biopsy] : biopsy [Flow Cytometry] : flow cytometry [] : The patient was instructed to remove the bandage the following AM. The patient may bathe. Acetaminophen may be taken for discomfort, as per package directions.If there are any other problems, the patient was instructed to call the office. The patient verbalized understanding, and is aware of the office contact numbers.

## 2021-11-23 NOTE — REASON FOR VISIT
[Follow-Up Visit] : a follow-up visit for [FreeTextEntry2] : Here for follow up of breast cancer and MGUS

## 2021-11-23 NOTE — HISTORY OF PRESENT ILLNESS
[de-identified] : This is 60-year-old postmenopausal woman ending for new diagnosis of DCIS.\par December 2020 patient had a mammogram which showed a 5 mm group of faint calcifications at the 2 o'clock position left breast. Follow-up mammogram showed 1.3 cm of faint microcalcifications in January 2021. Then follow-up mammogram and March 2021 showed a 5 mm cluster of calcifications biopsy recommended. Stereotactic left breast biopsy performed on March 4, 2021 revealing for DCIS intermediate grade ER/MO positive\par Patient underwent a left partial mastectomy on April 2, 2021 with Dr. Del Rosario, this was revealing for 2.5 cm of DCIS intermediate grade negative margins ER/MO positive(99 and 95%). \par All went well with surgery. No infections \par She saw dr monterroso and he is planning on starting radiation. Maping on monday. \par \par Partime at [employer] , mostly fed ex. \par \par Every year gyn, only has cervix \par \par neice - had some kind of cancer \par son- had kidney cancer \par \par Saw genetic , sent testing as part of gene protocol. 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 \par saw dr kline aug 2021

## 2021-11-23 NOTE — ASSESSMENT
[FreeTextEntry1] : Is a 60-year-old woman with DCIS ER/AZ positive of the left breast status post lumpectomy, presenting for discussion regarding adjuvant antiestrogen therapy.\par \par 1) DCIS \par Reviewed the pathology at length\par Explained the risk of recurrence both in the ipsilateral and contralateral breast\par s/p RT - tolerated well \par s.p michel Del Rosario breast surgeon in August 2021\par repeat mammo jan 2022 \par tolerating ai well \par cont anastrozole \par \par 2) genetics \par genetics negative\par \par 3) vit d \par check level \par \par 4) osteopenia \par dexa shows osteopenia , dw patient \par consider zometa or prolia once stable \par cont vit d and exercise \par \par 5) mgus \par Patient now has an IgG lambda MGUS\par weak Ig A band on most recent KAHLIL was previous Ig G kappa Oct 2021 \par Discussed with patient at length, especially given that she is anemic as well this may represent an underlying bone marrow problem including lymphoma or myeloma.  Alternatively this may be due to some sort of autoimmune or infectious condition\par I would recommend a bone marrow biopsy to rule out the possibility of lymphoma and myeloma causing the MGUS.\par Proceed with bone marrow biopsy for MGUS as well as for anemia\par Risks and benefits discussed with patient at length patient signed consent\par \par \par \par 6) Anemia \par Reviewed anemia work-up\par Discussed the positive Oneida test as well as the MGUS\par -Proceed with bone marrow biopsy today to rule out underlying bone marrow disorder, see above procedure note \par \par dw patient at length \par proceed with BM bx \par follow up in 2 weeks

## 2021-12-22 ENCOUNTER — APPOINTMENT (OUTPATIENT)
Dept: HEMATOLOGY ONCOLOGY | Facility: CLINIC | Age: 61
End: 2021-12-22
Payer: COMMERCIAL

## 2021-12-22 VITALS
SYSTOLIC BLOOD PRESSURE: 151 MMHG | OXYGEN SATURATION: 98 % | DIASTOLIC BLOOD PRESSURE: 79 MMHG | HEIGHT: 69 IN | BODY MASS INDEX: 31.99 KG/M2 | TEMPERATURE: 98.7 F | RESPIRATION RATE: 18 BRPM | WEIGHT: 216 LBS | HEART RATE: 100 BPM

## 2021-12-22 DIAGNOSIS — R76.8 OTHER SPECIFIED ABNORMAL IMMUNOLOGICAL FINDINGS IN SERUM: ICD-10-CM

## 2021-12-22 DIAGNOSIS — M85.852 OTHER SPECIFIED DISORDERS OF BONE DENSITY AND STRUCTURE, RIGHT THIGH: ICD-10-CM

## 2021-12-22 DIAGNOSIS — M85.851 OTHER SPECIFIED DISORDERS OF BONE DENSITY AND STRUCTURE, RIGHT THIGH: ICD-10-CM

## 2021-12-22 DIAGNOSIS — E55.9 VITAMIN D DEFICIENCY, UNSPECIFIED: ICD-10-CM

## 2021-12-22 DIAGNOSIS — D47.2 MONOCLONAL GAMMOPATHY: ICD-10-CM

## 2021-12-22 PROCEDURE — 99214 OFFICE O/P EST MOD 30 MIN: CPT

## 2021-12-22 NOTE — REASON FOR VISIT
[Follow-Up Visit] : a follow-up visit for [FreeTextEntry2] : Here for follow up of bone marrow biopsy results

## 2021-12-22 NOTE — HISTORY OF PRESENT ILLNESS
[de-identified] : This is 60-year-old postmenopausal woman ending for new diagnosis of DCIS.\par December 2020 patient had a mammogram which showed a 5 mm group of faint calcifications at the 2 o'clock position left breast. Follow-up mammogram showed 1.3 cm of faint microcalcifications in January 2021. Then follow-up mammogram and March 2021 showed a 5 mm cluster of calcifications biopsy recommended. Stereotactic left breast biopsy performed on March 4, 2021 revealing for DCIS intermediate grade ER/CT positive\par Patient underwent a left partial mastectomy on April 2, 2021 with Dr. Del Rosario, this was revealing for 2.5 cm of DCIS intermediate grade negative margins ER/CT positive(99 and 95%). \par All went well with surgery. No infections \par She saw dr monterroso and he is planning on starting radiation. Maping on monday. \par \par [employer]ime at [employer] , mostly fed ex. \par \par Every year gyn, only has cervix \par \par neice - had some kind of cancer \par son- had kidney cancer \par \par Saw genetic , sent testing as part of gene protocol. 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 \par BM BX - nov 23 2021 -normal cellular marrow with a polyclonal plasmacytosis no evidence of myeloma or lymphoma.  Cytogenetics pending

## 2021-12-22 NOTE — ASSESSMENT
[FreeTextEntry1] : Is a 60-year-old woman with DCIS ER/CT positive of the left breast status post lumpectomy, presenting for discussion regarding adjuvant antiestrogen therapy.\par \par 1) DCIS \par doing well RAJIV \par s/p RT - tolerated well \par s.p eval  Dr. 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 an IgG lambda MGUS\par weak Ig A band on most recent KAHLIL was previous Ig G kappa Oct 2021 \par -Given anemia and MGUS, I recommended a bone marrow biopsy to rule out lymphoma or myeloma \par -bone marrow biopsy performed November 2021 shows no evidence of  lymphoma or myeloma\par -cont to moniter mspike \par \par \par 6) Anemia \par Reviewed anemia work-up\par Discussed the positive Jl but normal haptoglbin \par still  unclear cause of anemia \par noted to have MGUS also \par -BM bx shows no myeloma or lymphoma or MDS  and no other obvious evidence of cause of anemia \par -dw patient at length \par -repeat work up today - iron etc \par - need [facility] testing and repeat hemolysi work up  including jl \par -suggest ct of c/a/p to rule out malignancy \par \par \par patient moving to [place] \par advised she follow up with heme/onc in [place] \par dw patient the improtance of follow up \par

## 2021-12-23 ENCOUNTER — APPOINTMENT (OUTPATIENT)
Dept: BREAST CENTER | Facility: CLINIC | Age: 61
End: 2021-12-23
Payer: COMMERCIAL

## 2021-12-23 VITALS
DIASTOLIC BLOOD PRESSURE: 74 MMHG | SYSTOLIC BLOOD PRESSURE: 132 MMHG | HEART RATE: 87 BPM | WEIGHT: 218 LBS | HEIGHT: 69 IN | BODY MASS INDEX: 32.29 KG/M2

## 2021-12-23 DIAGNOSIS — T66.XXXS RADIATION SICKNESS, UNSPECIFIED, SEQUELA: ICD-10-CM

## 2021-12-23 DIAGNOSIS — D05.12 INTRADUCTAL CARCINOMA IN SITU OF LEFT BREAST: ICD-10-CM

## 2021-12-23 DIAGNOSIS — Z92.3 PERSONAL HISTORY OF IRRADIATION: ICD-10-CM

## 2021-12-23 DIAGNOSIS — D64.9 ANEMIA, UNSPECIFIED: ICD-10-CM

## 2021-12-23 PROCEDURE — 99213 OFFICE O/P EST LOW 20 MIN: CPT

## 2021-12-23 NOTE — PHYSICAL EXAM
[Normocephalic] : normocephalic [Atraumatic] : atraumatic [Supple] : supple [No Supraclavicular Adenopathy] : no supraclavicular adenopathy [No Cervical Adenopathy] : no cervical adenopathy [No Thyromegaly] : no thyromegaly [Normal Sinus Rhythm] : normal sinus rhythm [Examined in the supine and seated position] : examined in the supine and seated position [No dominant masses] : no dominant masses in right breast  [No dominant masses] : no dominant masses left breast [No Nipple Retraction] : no left nipple retraction [No Nipple Discharge] : no left nipple discharge [No Axillary Lymphadenopathy] : no left axillary lymphadenopathy [No Edema] : no edema [No Rashes] : no rashes [No Ulceration] : no ulceration [de-identified] : S/P PMX/RT. NER. %. No lymphedema. \par +mod RT tanning

## 2021-12-23 NOTE — HISTORY OF PRESENT ILLNESS
[FreeTextEntry1] : Pt moving to [place] next wk > will pursue F/U care there\par S/P L PMX w/NL (4/2/21): +2.5cm DCIS, int grade, -margins, ER+, OH+\par Pt w/ L DCIS detected as microcalcs on Scr Mammo (12/8/20)\par L Stage 0 DCIS\par Completed RT (6/21)([facility])\par Started Arimidex (6/21)(Jennifer)\par Mammo (12/8/20): FG, +5mm group faint calc's L 2:00 N5\par L Mammo w/ Mag Views (1/6/21): +1.3cm faint microcalcs L 2:00 N5 > Bx rec'ed\par Repeat L Mammo w/ Mag Views ([place])(3/3/21)" +5mm cluster calc's L 2:00 > Bx rec'ed\par S/P L Stereot Bx (L 2:00)(3/4/21): +DCIS, int grade, ER+, OH+\par Being w/u'ed for low Hgb by Dr Rodriguez\par Got Moderna booster (12/22/21)(LUE)\par No other Breast Surgery, Breast Procedures or Nipple Discharge. \par No FH Breast, Ovarian, Pancreatic Cancer or Melanoma. \par No MH/FH changes. Taking Ca/D. ROS reviewed/discussed. Last Bone Densitometry (6/21): WNL\par

## 2021-12-26 LAB
CANCER AG15-3 SERPL-ACNC: 36.2 U/ML
CEA SERPL-MCNC: 1.3 NG/ML
DEPRECATED KAPPA LC FREE/LAMBDA SER: 2.21 RATIO
DEPRECATED KAPPA LC FREE/LAMBDA SER: 2.21 RATIO
FERRITIN SERPL-MCNC: 93 NG/ML
FOLATE SERPL-MCNC: 11.7 NG/ML
IGA SER QL IEP: 210 MG/DL
IGG SER QL IEP: 1876 MG/DL
IGM SER QL IEP: 96 MG/DL
KAPPA LC CSF-MCNC: 1.96 MG/DL
KAPPA LC CSF-MCNC: 1.96 MG/DL
KAPPA LC SERPL-MCNC: 4.34 MG/DL
KAPPA LC SERPL-MCNC: 4.34 MG/DL
VIT B12 SERPL-MCNC: 626 PG/ML

## 2021-12-30 ENCOUNTER — NON-APPOINTMENT (OUTPATIENT)
Age: 61
End: 2021-12-30

## 2021-12-31 LAB
ALBUMIN MFR SERPL ELPH: 44.1 %
ALBUMIN SERPL-MCNC: 3.5 G/DL
ALBUMIN/GLOB SERPL: 0.8 RATIO
ALPHA1 GLOB MFR SERPL ELPH: 6.2 %
ALPHA1 GLOB SERPL ELPH-MCNC: 0.5 G/DL
ALPHA2 GLOB MFR SERPL ELPH: 14.7 %
ALPHA2 GLOB SERPL ELPH-MCNC: 1.2 G/DL
B-GLOBULIN MFR SERPL ELPH: 12.9 %
B-GLOBULIN SERPL ELPH-MCNC: 1 G/DL
GAMMA GLOB FLD ELPH-MCNC: 1.7 G/DL
GAMMA GLOB MFR SERPL ELPH: 22.1 %
INTERPRETATION SERPL IEP-IMP: NORMAL
M PROTEIN MFR SERPL ELPH: NORMAL
M PROTEIN SPEC IFE-MCNC: NORMAL
MONOCLON BAND OBS SERPL: NORMAL
PROT SERPL-MCNC: 7.9 G/DL
PROT SERPL-MCNC: 7.9 G/DL

## 2022-08-09 RX ORDER — ANASTROZOLE TABLETS 1 MG/1
1 TABLET ORAL DAILY
Qty: 30 | Refills: 3 | Status: ACTIVE | COMMUNITY
Start: 2021-06-11 | End: 1900-01-01
